# Patient Record
Sex: FEMALE | Race: WHITE | Employment: OTHER | URBAN - METROPOLITAN AREA
[De-identification: names, ages, dates, MRNs, and addresses within clinical notes are randomized per-mention and may not be internally consistent; named-entity substitution may affect disease eponyms.]

---

## 2018-04-04 ENCOUNTER — TELEPHONE (OUTPATIENT)
Dept: ADMINISTRATIVE | Age: 38
End: 2018-04-04

## 2019-07-12 ENCOUNTER — OFFICE VISIT (OUTPATIENT)
Dept: FAMILY MEDICINE CLINIC | Age: 39
End: 2019-07-12
Payer: COMMERCIAL

## 2019-07-12 VITALS
WEIGHT: 141 LBS | OXYGEN SATURATION: 98 % | TEMPERATURE: 99.1 F | SYSTOLIC BLOOD PRESSURE: 102 MMHG | DIASTOLIC BLOOD PRESSURE: 70 MMHG | HEIGHT: 66 IN | HEART RATE: 78 BPM | BODY MASS INDEX: 22.66 KG/M2

## 2019-07-12 DIAGNOSIS — G47.20 DISRUPTED SLEEP-WAKE CYCLE: ICD-10-CM

## 2019-07-12 DIAGNOSIS — F17.210 CIGARETTE NICOTINE DEPENDENCE WITHOUT COMPLICATION: ICD-10-CM

## 2019-07-12 DIAGNOSIS — F51.8 DISRUPTED SLEEP-WAKE CYCLE: ICD-10-CM

## 2019-07-12 DIAGNOSIS — Z00.00 WELL ADULT EXAM: Primary | ICD-10-CM

## 2019-07-12 PROCEDURE — 99203 OFFICE O/P NEW LOW 30 MIN: CPT | Performed by: FAMILY MEDICINE

## 2019-07-12 PROCEDURE — 4004F PT TOBACCO SCREEN RCVD TLK: CPT | Performed by: FAMILY MEDICINE

## 2019-07-12 PROCEDURE — G8420 CALC BMI NORM PARAMETERS: HCPCS | Performed by: FAMILY MEDICINE

## 2019-07-12 PROCEDURE — G8427 DOCREV CUR MEDS BY ELIG CLIN: HCPCS | Performed by: FAMILY MEDICINE

## 2019-07-12 RX ORDER — VARENICLINE TARTRATE 25 MG
KIT ORAL
Qty: 1 BOX | Refills: 0 | Status: SHIPPED
Start: 2019-07-12 | End: 2020-07-13

## 2019-07-12 RX ORDER — CHLORAL HYDRATE 500 MG
3000 CAPSULE ORAL 3 TIMES DAILY
COMMUNITY

## 2019-07-12 ASSESSMENT — PATIENT HEALTH QUESTIONNAIRE - PHQ9
SUM OF ALL RESPONSES TO PHQ9 QUESTIONS 1 & 2: 0
SUM OF ALL RESPONSES TO PHQ QUESTIONS 1-9: 0
2. FEELING DOWN, DEPRESSED OR HOPELESS: 0
SUM OF ALL RESPONSES TO PHQ QUESTIONS 1-9: 0
1. LITTLE INTEREST OR PLEASURE IN DOING THINGS: 0

## 2019-07-12 NOTE — PROGRESS NOTES
Used    Tobacco comment: started patch on 10/18/15   Substance Use Topics    Alcohol use: Yes     Alcohol/week: 0.0 oz    Drug use: Never   She remains in relationship. She is raising her son and daughter. She continues working, self-employed. ROS:  No CP, No palpitations,   No sob, No cough,   No abd pain, No heartburn,   No headaches,   No tingling, No numbness, No weakness,   No bowel changes, No hematochezia, No melena,  No bladder changes, No hematuria  No skin rashes, No skin lesions. No vision changes, No hearing changes,   No polyuria, polydipsia, polyphagia. Stable mood. ROS otherwise negative unless as listed in HPI. Chart reviewed and updated where appropriate for PMH, Fam, and Soc Hx. Physical Exam   /70 (Site: Left Upper Arm, Position: Sitting, Cuff Size: Medium Adult)   Pulse 78   Temp 99.1 °F (37.3 °C) (Oral)   Ht 5' 6\" (1.676 m)   Wt 141 lb (64 kg)   SpO2 98%   BMI 22.76 kg/m²   Wt Readings from Last 3 Encounters:   07/12/19 141 lb (64 kg)       Constitutional:    She is oriented to person, place, and time. She appears well-developed and well-nourished. HENT:    Nose: Nose normal.    Mouth/Throat: Oropharynx is clear and moist.   Eyes:    Conjunctivae are normal.    Pupils are equal, round, and reactive to light. EOMI. Neck:    Normal range of motion. No thyromegaly or nodules noted. No bruit. No adenopathy  Cardiovascular:    Normal rate, regular rhythm and normal heart sounds. No murmur. No gallop and no friction rub. Pulmonary/Chest:    Effort normal and breath sounds normal.    No wheezes. No rales or rhonchi. Abdominal:    Soft. Bowel sounds are normal.    No distension. No tenderness. Musculoskeletal:    Normal range of motion. No joint swelling noted. No peripheral edema. Skin:    Skin is warm and dry. No rashes, lesions. Psychiatric:    She has a normal mood and affect. Normal groom and dress.     Current Outpatient

## 2020-01-30 ENCOUNTER — TELEPHONE (OUTPATIENT)
Dept: FAMILY MEDICINE CLINIC | Age: 40
End: 2020-01-30

## 2020-01-30 NOTE — TELEPHONE ENCOUNTER
Called pt to remind her of her labs that need drawn. Pt plans on coming in Monday. This is 3rd attempt reminding pt.  Labs extended 2 weeks to 2/13

## 2020-02-21 ENCOUNTER — TELEPHONE (OUTPATIENT)
Dept: FAMILY MEDICINE CLINIC | Age: 40
End: 2020-02-21

## 2020-02-24 ENCOUNTER — HOSPITAL ENCOUNTER (OUTPATIENT)
Age: 40
Discharge: HOME | End: 2020-02-26
Payer: COMMERCIAL

## 2020-02-24 PROCEDURE — 88175 CYTOPATH C/V AUTO FLUID REDO: CPT

## 2020-02-24 PROCEDURE — 87624 HPV HI-RISK TYP POOLED RSLT: CPT

## 2020-02-27 LAB
HPV SAMPLE: NORMAL
HPV TYPE 16: NOT DETECTED
HPV TYPE 18: NOT DETECTED
HPV, HIGH RISK OTHER: NOT DETECTED
INTERPRETATION: NORMAL
SOURCE: NORMAL

## 2020-03-03 ENCOUNTER — TELEPHONE (OUTPATIENT)
Dept: FAMILY MEDICINE CLINIC | Age: 40
End: 2020-03-03

## 2020-04-28 ENCOUNTER — VIRTUAL VISIT (OUTPATIENT)
Dept: PRIMARY CARE CLINIC | Age: 40
End: 2020-04-28
Payer: COMMERCIAL

## 2020-04-28 VITALS — BODY MASS INDEX: 21.48 KG/M2 | HEIGHT: 69 IN | WEIGHT: 145 LBS

## 2020-04-28 PROCEDURE — G8427 DOCREV CUR MEDS BY ELIG CLIN: HCPCS | Performed by: FAMILY MEDICINE

## 2020-04-28 PROCEDURE — G8420 CALC BMI NORM PARAMETERS: HCPCS | Performed by: FAMILY MEDICINE

## 2020-04-28 PROCEDURE — 99213 OFFICE O/P EST LOW 20 MIN: CPT | Performed by: FAMILY MEDICINE

## 2020-04-28 PROCEDURE — 4004F PT TOBACCO SCREEN RCVD TLK: CPT | Performed by: FAMILY MEDICINE

## 2020-04-28 RX ORDER — PREDNISONE 10 MG/1
TABLET ORAL
Qty: 30 TABLET | Refills: 0 | Status: SHIPPED | OUTPATIENT
Start: 2020-04-28 | End: 2020-05-08

## 2020-04-28 ASSESSMENT — PATIENT HEALTH QUESTIONNAIRE - PHQ9
SUM OF ALL RESPONSES TO PHQ QUESTIONS 1-9: 0
1. LITTLE INTEREST OR PLEASURE IN DOING THINGS: 0
2. FEELING DOWN, DEPRESSED OR HOPELESS: 0
SUM OF ALL RESPONSES TO PHQ9 QUESTIONS 1 & 2: 0
SUM OF ALL RESPONSES TO PHQ QUESTIONS 1-9: 0

## 2020-07-13 ENCOUNTER — OFFICE VISIT (OUTPATIENT)
Dept: FAMILY MEDICINE CLINIC | Age: 40
End: 2020-07-13
Payer: COMMERCIAL

## 2020-07-13 ENCOUNTER — HOSPITAL ENCOUNTER (OUTPATIENT)
Age: 40
Discharge: HOME | End: 2020-07-15
Payer: COMMERCIAL

## 2020-07-13 VITALS
WEIGHT: 145 LBS | DIASTOLIC BLOOD PRESSURE: 76 MMHG | HEART RATE: 87 BPM | TEMPERATURE: 98.3 F | HEIGHT: 69 IN | OXYGEN SATURATION: 98 % | BODY MASS INDEX: 21.48 KG/M2 | SYSTOLIC BLOOD PRESSURE: 126 MMHG

## 2020-07-13 LAB
ALBUMIN SERPL-MCNC: 4.3 G/DL (ref 3.5–5.2)
ALP BLD-CCNC: 31 U/L (ref 35–104)
ALT SERPL-CCNC: 9 U/L (ref 0–32)
ANION GAP SERPL CALCULATED.3IONS-SCNC: 14 MMOL/L (ref 7–16)
AST SERPL-CCNC: 18 U/L (ref 0–31)
BASOPHILS ABSOLUTE: 0.06 E9/L (ref 0–0.2)
BASOPHILS RELATIVE PERCENT: 0.9 % (ref 0–2)
BILIRUB SERPL-MCNC: 0.2 MG/DL (ref 0–1.2)
BUN BLDV-MCNC: 9 MG/DL (ref 6–20)
CALCIUM SERPL-MCNC: 9.5 MG/DL (ref 8.6–10.2)
CHLORIDE BLD-SCNC: 103 MMOL/L (ref 98–107)
CHOLESTEROL, TOTAL: 184 MG/DL (ref 0–199)
CO2: 23 MMOL/L (ref 22–29)
CREAT SERPL-MCNC: 0.7 MG/DL (ref 0.5–1)
EOSINOPHILS ABSOLUTE: 0.08 E9/L (ref 0.05–0.5)
EOSINOPHILS RELATIVE PERCENT: 1.1 % (ref 0–6)
GFR AFRICAN AMERICAN: >60
GFR NON-AFRICAN AMERICAN: >60 ML/MIN/1.73
GLUCOSE BLD-MCNC: 88 MG/DL (ref 74–99)
HCT VFR BLD CALC: 38.9 % (ref 34–48)
HDLC SERPL-MCNC: 102 MG/DL
HEMOGLOBIN: 12.5 G/DL (ref 11.5–15.5)
IMMATURE GRANULOCYTES #: 0.03 E9/L
IMMATURE GRANULOCYTES %: 0.4 % (ref 0–5)
LDL CHOLESTEROL CALCULATED: 72 MG/DL (ref 0–99)
LYMPHOCYTES ABSOLUTE: 1.59 E9/L (ref 1.5–4)
LYMPHOCYTES RELATIVE PERCENT: 22.7 % (ref 20–42)
MCH RBC QN AUTO: 31.4 PG (ref 26–35)
MCHC RBC AUTO-ENTMCNC: 32.1 % (ref 32–34.5)
MCV RBC AUTO: 97.7 FL (ref 80–99.9)
MONOCYTES ABSOLUTE: 0.48 E9/L (ref 0.1–0.95)
MONOCYTES RELATIVE PERCENT: 6.9 % (ref 2–12)
NEUTROPHILS ABSOLUTE: 4.75 E9/L (ref 1.8–7.3)
NEUTROPHILS RELATIVE PERCENT: 68 % (ref 43–80)
PDW BLD-RTO: 13.2 FL (ref 11.5–15)
PLATELET # BLD: 301 E9/L (ref 130–450)
PMV BLD AUTO: 9.6 FL (ref 7–12)
POTASSIUM SERPL-SCNC: 4.4 MMOL/L (ref 3.5–5)
RBC # BLD: 3.98 E12/L (ref 3.5–5.5)
SODIUM BLD-SCNC: 140 MMOL/L (ref 132–146)
TOTAL PROTEIN: 6.7 G/DL (ref 6.4–8.3)
TRIGL SERPL-MCNC: 50 MG/DL (ref 0–149)
VLDLC SERPL CALC-MCNC: 10 MG/DL
WBC # BLD: 7 E9/L (ref 4.5–11.5)

## 2020-07-13 PROCEDURE — 99396 PREV VISIT EST AGE 40-64: CPT | Performed by: FAMILY MEDICINE

## 2020-07-13 PROCEDURE — 80053 COMPREHEN METABOLIC PANEL: CPT

## 2020-07-13 PROCEDURE — 80061 LIPID PANEL: CPT

## 2020-07-13 PROCEDURE — 36415 COLL VENOUS BLD VENIPUNCTURE: CPT

## 2020-07-13 PROCEDURE — 85025 COMPLETE CBC W/AUTO DIFF WBC: CPT

## 2020-07-13 RX ORDER — ASCORBIC ACID 500 MG
500 TABLET ORAL DAILY
COMMUNITY

## 2020-07-13 RX ORDER — NICOTINE 21 MG/24HR
1 PATCH, TRANSDERMAL 24 HOURS TRANSDERMAL EVERY 24 HOURS
Qty: 30 PATCH | Refills: 0 | Status: SHIPPED
Start: 2020-07-13 | End: 2021-07-13

## 2020-07-13 NOTE — PROGRESS NOTES
[facility]  Office Progress Note - Dr. Salbador Perez  7/13/20    CC:   Chief Complaint   Patient presents with    Annual Exam        HPI:   Patient presents for yearly physical.     Overall they are doing well. Concerns: started smoking again about 2 weeks ago after off cigs for 6 months. Was drinking, was stressed. She did not tolerate chantix. Made her feel bad. Weight reviewed. Body mass index is 21.41 kg/m². Wt Readings from Last 3 Encounters:   07/13/20 145 lb (65.8 kg)   04/28/20 145 lb (65.8 kg)   02/24/20 152 lb (68.9 kg)     Patient is  currently working on diet. Patient is  currently working on exercise. Exercises regularly and works with a . Vaccines reviewed. Discussed age appropriate vaccine recommendations. HM Reviewed. Discussed age appropriate HM topics. Patient was encouraged to update HM topics. Ordered where agreeable. Reviewed age appropriate anticipatory guidance. Recent labs reviewed include: none    The ASCVD Risk score (Federica Gupta., et al., 2013) failed to calculate for the following reasons:    Cannot find a previous HDL lab    Cannot find a previous total cholesterol lab    PMH, FH, SxHx, Social Hx reviewed and updated if needed. Had covid testing recently - about 10 days ago and found to be neg     Gyne notes from feb reviewed. Had small cysts in breast and pap performed.         _________________________________________________________  Past Medical History:   Diagnosis Date    Abnormal Pap smear of cervix        Family History   Problem Relation Age of Onset    Other Mother         hysterectomy at 27 due to heavy bleeding    Diabetes Father         ON his side.  Breast Cancer Maternal Grandmother        History reviewed. No pertinent surgical history.     Social History     Tobacco Use    Smoking status: Current Some Day Smoker     Packs/day: 1.00     Years: 24.00     Pack years: 24.00     Types: Cigarettes    Smokeless tobacco: Never Used    Tobacco comment: started patch on 10/18/15   Substance Use Topics    Alcohol use: Yes     Alcohol/week: 0.0 standard drinks    Drug use: Never     _________________________________________________________  ROS: POSITIVE:  Otherwise:  No CP, No palpitations,   No sob, No cough,   No abd pain, No heartburn,   No headaches, No vision changes, No hearing changes,   No tingling, No numbness, No weakness,   No bowel changes, No hematochezia, No melena,  No bladder changes, No hematuria  No skin rashes, No skin lesions. No polyuria, polydipsia, polyphagia. Stable mood. ROS otherwise negative unless as listed in HPI. Chart reviewed and updated where appropriate for PMH, Fam, and Soc Hx.  __________________________________________________________  Physical Exam   /76 (Site: Left Upper Arm, Position: Sitting, Cuff Size: Medium Adult)   Pulse 87   Temp 98.3 °F (36.8 °C) (Temporal)   Ht 5' 9\" (1.753 m)   Wt 145 lb (65.8 kg)   SpO2 98%   BMI 21.41 kg/m²   Wt Readings from Last 3 Encounters:   07/13/20 145 lb (65.8 kg)   04/28/20 145 lb (65.8 kg)   02/24/20 152 lb (68.9 kg)       Constitutional:    She is oriented to person, place, and time. She appears well-developed and well-nourished. HENT:    Nose: Nose normal.    Mouth/Throat: Oropharynx is clear and moist.   Eyes:    Conjunctivae are normal.    Pupils are equal, round, and reactive to light. EOMI. Neck:    Normal range of motion. No thyromegaly or nodules noted. No bruit. Cardiovascular:    Normal rate, regular rhythm and normal heart sounds. No murmur. No gallop and no friction rub. Pulmonary/Chest:    Effort normal and breath sounds normal.    No wheezes. No rales or rhonchi. Abdominal:    Soft. Bowel sounds are normal.    No distension. No tenderness. Musculoskeletal:    Normal range of motion. No joint swelling noted. No peripheral edema. Skin:    Skin is warm and dry.     No rashes, using dictation software.  Efforts were made to reduce grammatical or syntax errors, but some may persist.

## 2021-07-13 ENCOUNTER — OFFICE VISIT (OUTPATIENT)
Dept: FAMILY MEDICINE CLINIC | Age: 41
End: 2021-07-13
Payer: COMMERCIAL

## 2021-07-13 VITALS
WEIGHT: 145.8 LBS | TEMPERATURE: 97.6 F | BODY MASS INDEX: 21.59 KG/M2 | SYSTOLIC BLOOD PRESSURE: 118 MMHG | RESPIRATION RATE: 16 BRPM | DIASTOLIC BLOOD PRESSURE: 62 MMHG | OXYGEN SATURATION: 98 % | HEART RATE: 78 BPM | HEIGHT: 69 IN

## 2021-07-13 DIAGNOSIS — F41.9 ANXIETY: ICD-10-CM

## 2021-07-13 DIAGNOSIS — Z00.00 ENCOUNTER FOR WELL ADULT EXAM WITHOUT ABNORMAL FINDINGS: Primary | ICD-10-CM

## 2021-07-13 PROCEDURE — 99396 PREV VISIT EST AGE 40-64: CPT | Performed by: FAMILY MEDICINE

## 2021-07-13 RX ORDER — FLUOXETINE 10 MG/1
10 CAPSULE ORAL DAILY
Qty: 30 CAPSULE | Refills: 3 | Status: SHIPPED
Start: 2021-07-13 | End: 2021-08-23

## 2021-07-13 SDOH — ECONOMIC STABILITY: FOOD INSECURITY: WITHIN THE PAST 12 MONTHS, YOU WORRIED THAT YOUR FOOD WOULD RUN OUT BEFORE YOU GOT MONEY TO BUY MORE.: NEVER TRUE

## 2021-07-13 SDOH — ECONOMIC STABILITY: FOOD INSECURITY: WITHIN THE PAST 12 MONTHS, THE FOOD YOU BOUGHT JUST DIDN'T LAST AND YOU DIDN'T HAVE MONEY TO GET MORE.: NEVER TRUE

## 2021-07-13 ASSESSMENT — PATIENT HEALTH QUESTIONNAIRE - PHQ9
SUM OF ALL RESPONSES TO PHQ QUESTIONS 1-9: 0
2. FEELING DOWN, DEPRESSED OR HOPELESS: 0
SUM OF ALL RESPONSES TO PHQ QUESTIONS 1-9: 0
1. LITTLE INTEREST OR PLEASURE IN DOING THINGS: 0
SUM OF ALL RESPONSES TO PHQ QUESTIONS 1-9: 0
SUM OF ALL RESPONSES TO PHQ9 QUESTIONS 1 & 2: 0

## 2021-07-13 ASSESSMENT — SOCIAL DETERMINANTS OF HEALTH (SDOH): HOW HARD IS IT FOR YOU TO PAY FOR THE VERY BASICS LIKE FOOD, HOUSING, MEDICAL CARE, AND HEATING?: NOT VERY HARD

## 2021-07-13 NOTE — PROGRESS NOTES
[facility]  Office Progress Note - Dr. Ernesto Lopez  7/13/21    CC:   Chief Complaint   Patient presents with    Annual Exam        /62   Pulse 78   Temp 97.6 °F (36.4 °C) (Temporal)   Resp 16   Ht 5' 9\" (1.753 m)   Wt 145 lb 12.8 oz (66.1 kg)   SpO2 98%   BMI 21.53 kg/m²   Wt Readings from Last 3 Encounters:   07/13/21 145 lb 12.8 oz (66.1 kg)   07/13/20 145 lb (65.8 kg)   04/28/20 145 lb (65.8 kg)       HPI:   Patient presents for yearly physical.     Overall they are doing well. Concerns: anxiety  Has tried to cut coffee back. Problem on and off. When occurs heart racing, 20 steps ahead of self, hard time concentrating, feels scattered. She did try effexor in the past, felt like a zombie, didn't take it long. In the winter, she will often get seasonal depression. Not feeling that recently. Drinks beer or wine pretty regularly, but she does not feel to excess. Does take some breaks from that/fasts from it during periods of time. Weight reviewed. Body mass index is 21.53 kg/m². Wt Readings from Last 3 Encounters:   07/13/21 145 lb 12.8 oz (66.1 kg)   07/13/20 145 lb (65.8 kg)   04/28/20 145 lb (65.8 kg)     Patient is currently working on diet. Patient is currently working on exercise. Vaccines reviewed. Discussed age appropriate vaccine recommendations. HM Reviewed. Discussed age appropriate HM topics. Patient was encouraged to update HM topics. Ordered where agreeable. Reviewed age appropriate anticipatory guidance. Recent labs reviewed include: none, order today. Normal BMP, normal LFTs, normal glucose, normal TSH at 1.4, normal CBC. The ASCVD Risk score (Ethan Correa., et al., 2013) failed to calculate for the following reasons: The valid HDL cholesterol range is 20 to 100 mg/dL    PMH, FH, SxHx, Social Hx reviewed and updated if needed.         _________________________________________________________    Assessment / Plan  Betty was seen today for annual exam.    Diagnoses and all orders for this visit:    Encounter for well adult exam without abnormal findings  -     CBC Auto Differential; Future  -     Comprehensive Metabolic flu panel; Future  -     TSH without Reflex; Future  Overall Betty Jaimes is doing well. Age appropriate HM topics reviewed and updated where agreeable. Vaccines reviewed and updated where agreeable. Age appropriate anticipatory guidance discussed. Encouraged to work on [employer] and exercise strategies. Opportunity to ask questions and answers provided as able. Counseled on smoking cessation. Start some Prozac at low-dose for persistent anxiety. Discussed acceptable alcohol consumption guidelines and that chronic alcohol consumption can worsen/complicate anxiety    Recommend RTO in 1 year for annual physical.   1 month for follow-up on starting Prozac. Anxiety  -     FLUoxetine (PROZAC) 10 MG capsule; Take 1 capsule by mouth daily  Discussed common side effects of this medication and problems that would necessitate calling the office for advice or stopping the medication. All questions answered. Return in about 1 month (around 8/13/2021). or as scheduled. Patient counseled to follow up sooner or seek more acute care if symptoms worsening or not improving according to plan. Electronically signed by Oksana Hernandez MD on 7/14/2021    _________________________________________________________  Current Outpatient Medications on File Prior to Visit   Medication Sig Dispense Refill    vitamin C (ASCORBIC ACID) 500 MG tablet Take 500 mg by mouth daily      cyanocobalamin 1000 MCG tablet Take 5,000 mcg by mouth daily      Omega-3 Fatty Acids (FISH OIL) 1000 MG CAPS Take 3,000 mg by mouth 3 times daily      Probiotic Product (PROBIOTIC-10 PO) Take by mouth Every 3 days       No current facility-administered medications on file prior to visit.        Patient Active Problem List Diagnosis Code    Cigarette nicotine dependence without complication G18.907     _________________________________________________________  Past Medical History:   Diagnosis Date    Abnormal Pap smear of cervix        Family History   Problem Relation Age of Onset    Other Mother         hysterectomy at 27 due to heavy bleeding    Diabetes Father         ON his side.  Breast Cancer Maternal Grandmother        No past surgical history on file. Social History     Tobacco Use    Smoking status: Current Some Day Smoker     Packs/day: 1.00     Years: 24.00     Pack years: 24.00     Types: Cigarettes    Smokeless tobacco: Never Used    Tobacco comment: started patch on 10/18/15   Substance Use Topics    Alcohol use: Yes     Alcohol/week: 0.0 standard drinks    Drug use: Never       Chart reviewed and updated where appropriate for PMH, Fam, and Soc Hx.  _________________________________________________________  ROS: POSITIVE: As in the HPI. Otherwise Pertinent negatives are negative.    __________________________________________________________  Physical Exam   Constitutional:    She is oriented to person, place, and time. She appears well-developed and well-nourished. HENT:    Right Ear: normal Pinna, normal canal, normal TM. Left Ear: normal Pinna, normal canal, normal TM. Nose: Nose normal.    Mouth/Throat: Oropharynx is clear and moist.   Eyes:    Conjunctivae are normal.    Pupils are equal, round, and reactive to light. EOMI. Neck:    Normal range of motion. No thyromegaly or nodules noted. No bruit. No LAD. Cardiovascular:    Normal rate, regular rhythm and normal heart sounds. No murmur. No gallop and no friction rub. Pulmonary/Chest:    Effort normal and breath sounds normal.    No wheezes. No rales or rhonchi. Abdominal:    Soft. Bowel sounds are normal.    No distension. No tenderness. Musculoskeletal:    Normal range of motion. No joint swelling noted. No peripheral edema. Neurological:    She is A&Ox3    Motor and sensation grossly intact. Normal Gait. Skin:    Skin is warm and dry. No rashes, No lesions. Psychiatric:    She has an often anxious mood and normal affect. Normal groom and dress. No SI or HI.   ________________________________________________________    This note may have been created using dictation software.  Efforts were made to reduce errors, but some may persist.

## 2021-08-23 ENCOUNTER — OFFICE VISIT (OUTPATIENT)
Dept: FAMILY MEDICINE CLINIC | Age: 41
End: 2021-08-23
Payer: COMMERCIAL

## 2021-08-23 VITALS
TEMPERATURE: 98.6 F | OXYGEN SATURATION: 99 % | DIASTOLIC BLOOD PRESSURE: 84 MMHG | SYSTOLIC BLOOD PRESSURE: 116 MMHG | WEIGHT: 148 LBS | BODY MASS INDEX: 21.92 KG/M2 | HEART RATE: 60 BPM | HEIGHT: 69 IN

## 2021-08-23 DIAGNOSIS — F41.9 ANXIETY: Primary | ICD-10-CM

## 2021-08-23 PROCEDURE — G8427 DOCREV CUR MEDS BY ELIG CLIN: HCPCS | Performed by: FAMILY MEDICINE

## 2021-08-23 PROCEDURE — 4004F PT TOBACCO SCREEN RCVD TLK: CPT | Performed by: FAMILY MEDICINE

## 2021-08-23 PROCEDURE — 99213 OFFICE O/P EST LOW 20 MIN: CPT | Performed by: FAMILY MEDICINE

## 2021-08-23 PROCEDURE — G8420 CALC BMI NORM PARAMETERS: HCPCS | Performed by: FAMILY MEDICINE

## 2021-08-23 RX ORDER — FLUOXETINE HYDROCHLORIDE 20 MG/1
20 CAPSULE ORAL DAILY
Qty: 30 CAPSULE | Refills: 2 | Status: SHIPPED
Start: 2021-08-23 | End: 2022-01-04

## 2021-08-23 NOTE — PROGRESS NOTES
[facility]  Office Progress Note - Dr. Rebel Ibarra  8/23/21    CC:   Chief Complaint   Patient presents with    Anxiety     Started Prozac last month - Pt states she has seen some improvment in her anxiety        /84 (Site: Left Upper Arm, Position: Sitting, Cuff Size: Large Adult)   Pulse 60   Temp 98.6 °F (37 °C) (Temporal)   Ht 5' 9\" (1.753 m)   Wt 148 lb (67.1 kg)   SpO2 99%   BMI 21.86 kg/m²   Wt Readings from Last 3 Encounters:   08/23/21 148 lb (67.1 kg)   07/13/21 145 lb 12.8 oz (66.1 kg)   07/13/20 145 lb (65.8 kg)       HPI: Pt states she is feeling better. Takes prozac at end of day  Would like to up the dose bc some days she feels like different than others e.g. during her premenstrual days however it does not happen every month  Overall she feels her anxiety is going down, recently went to concert and enjoyed it, was not anxious around public  otherwise eating /drinking well enough  Sleep is ok but she still takes melatonin almost every day    She ate beets last week, and noticed red colored stool for 3 days. Resolved. _______________________________________________________    Assessment / Plan  Betty was seen today for anxiety. Diagnoses and all orders for this visit:    Anxiety  -     FLUoxetine (PROZAC) 20 MG capsule; Take 1 capsule by mouth daily  Improved  Increase to standard 20mg prozac. Call with problems. Otherwise check in in a few months, sooner if needed. Return in about 10 weeks (around 11/1/2021). or as scheduled. Patient counseled to follow up sooner or seek more acute care if symptoms worsening or not improving according to plan.      Electronically signed by Emerald Sawyer MD on 8/25/2021    _________________________________________________________  Current Outpatient Medications on File Prior to Visit   Medication Sig Dispense Refill    vitamin C (ASCORBIC ACID) 500 MG tablet Take 500 mg by mouth daily      Omega-3 Fatty Acids (FISH OIL) 1000 MG CAPS Take 3,000 mg by mouth 3 times daily      Probiotic Product (PROBIOTIC-10 PO) Take by mouth Every 3 days       No current facility-administered medications on file prior to visit. Patient Active Problem List   Diagnosis Code    Cigarette nicotine dependence without complication N98.820     _________________________________________________________  Past Medical History:   Diagnosis Date    Abnormal Pap smear of cervix        Family History   Problem Relation Age of Onset    Other Mother         hysterectomy at 27 due to heavy bleeding    Diabetes Father         ON his side.  Breast Cancer Maternal Grandmother        No past surgical history on file. Social History     Tobacco Use    Smoking status: Current Every Day Smoker     Packs/day: 0.75     Years: 24.00     Pack years: 18.00     Types: Cigarettes    Smokeless tobacco: Never Used    Tobacco comment: started patch on 10/18/15   Substance Use Topics    Alcohol use: Yes     Alcohol/week: 0.0 standard drinks    Drug use: Never       Chart reviewed and updated where appropriate for PMH, Fam, and Soc Hx.  _________________________________________________________  ROS: POSITIVE: As in the HPI. Otherwise Pertinent negatives are negative.    __________________________________________________________  Physical Exam   Constitutional:    She is oriented to person, place, and time. She appears well-developed and well-nourished. Cardiovascular:    Normal rate, regular rhythm and normal heart sounds. No murmur. No gallop and no friction rub. Pulmonary/Chest:    Effort normal and breath sounds normal.    No wheezes. No rales or rhonchi. Skin:    Skin is warm and dry. No rashes, No lesions. Psychiatric:    She has a normal mood and affect. Normal groom and dress. No SI or HI.   ________________________________________________________    This note may have been created using dictation software.  Efforts were made to reduce errors, but some may persist.

## 2021-09-15 ENCOUNTER — TELEPHONE (OUTPATIENT)
Dept: FAMILY MEDICINE CLINIC | Age: 41
End: 2021-09-15

## 2021-09-15 NOTE — TELEPHONE ENCOUNTER
Betty calling the office today and stated that she had two glasses of wine last night and when she woke up she felt dizzy and light headed. Pt stated that she has drank that many drink before and has never had that problem. Pt was wondering if it could be a reaction due to the new medication Prozac? Please advise.

## 2021-09-16 NOTE — TELEPHONE ENCOUNTER
It is possible that she had some interaction there which caused some excessive side effects. Would  her to be cautious with that.

## 2021-10-22 ENCOUNTER — OFFICE VISIT (OUTPATIENT)
Dept: FAMILY MEDICINE CLINIC | Age: 41
End: 2021-10-22
Payer: COMMERCIAL

## 2021-10-22 VITALS
WEIGHT: 147 LBS | SYSTOLIC BLOOD PRESSURE: 110 MMHG | DIASTOLIC BLOOD PRESSURE: 60 MMHG | OXYGEN SATURATION: 98 % | TEMPERATURE: 98 F | HEART RATE: 64 BPM | BODY MASS INDEX: 21.71 KG/M2

## 2021-10-22 DIAGNOSIS — R05.9 COUGH: ICD-10-CM

## 2021-10-22 DIAGNOSIS — R09.81 HEAD CONGESTION: ICD-10-CM

## 2021-10-22 DIAGNOSIS — J34.89 SINUS DRAINAGE: Primary | ICD-10-CM

## 2021-10-22 LAB
Lab: ABNORMAL
PERFORMING INSTRUMENT: ABNORMAL
QC PASS/FAIL: ABNORMAL
SARS-COV-2, POC: DETECTED

## 2021-10-22 PROCEDURE — 99213 OFFICE O/P EST LOW 20 MIN: CPT | Performed by: INTERNAL MEDICINE

## 2021-10-22 PROCEDURE — G8427 DOCREV CUR MEDS BY ELIG CLIN: HCPCS | Performed by: INTERNAL MEDICINE

## 2021-10-22 PROCEDURE — 4004F PT TOBACCO SCREEN RCVD TLK: CPT | Performed by: INTERNAL MEDICINE

## 2021-10-22 PROCEDURE — 87426 SARSCOV CORONAVIRUS AG IA: CPT | Performed by: INTERNAL MEDICINE

## 2021-10-22 PROCEDURE — G8484 FLU IMMUNIZE NO ADMIN: HCPCS | Performed by: INTERNAL MEDICINE

## 2021-10-22 PROCEDURE — G8420 CALC BMI NORM PARAMETERS: HCPCS | Performed by: INTERNAL MEDICINE

## 2021-10-22 RX ORDER — FLUTICASONE PROPIONATE 50 MCG
2 SPRAY, SUSPENSION (ML) NASAL DAILY
Qty: 16 G | Refills: 0 | Status: SHIPPED
Start: 2021-10-22 | End: 2022-01-06

## 2021-10-22 RX ORDER — DOXYCYCLINE HYCLATE 100 MG
100 TABLET ORAL 2 TIMES DAILY
Qty: 20 TABLET | Refills: 0 | Status: SHIPPED | OUTPATIENT
Start: 2021-10-22 | End: 2021-11-01

## 2021-10-22 RX ORDER — PREDNISONE 10 MG/1
TABLET ORAL
Qty: 12 TABLET | Refills: 0 | Status: SHIPPED | OUTPATIENT
Start: 2021-10-22 | End: 2021-10-28

## 2021-10-24 ASSESSMENT — ENCOUNTER SYMPTOMS
NAUSEA: 0
VOMITING: 0
EYES NEGATIVE: 1
SORE THROAT: 1
ABDOMINAL PAIN: 0
CHEST TIGHTNESS: 0
DIARRHEA: 0
SHORTNESS OF BREATH: 0
WHEEZING: 0
COUGH: 1
SINUS PRESSURE: 0
SINUS PAIN: 0

## 2021-10-24 NOTE — PROGRESS NOTES
[address] [place]     10/24/21  Betty Ponce : 1980 Sex: female  Age: 35 y.o. Chief Complaint   Patient presents with    Congestion    Cough    Headache    Pharyngitis       HPI  Patient presents today to express care complaining of 8 to 9 days worth of sore throat, head congestion, headache cough and chills. Has had some yellow sinus drainage and sputum production. Denies  myalgia or loss of taste and smell. Patient is unvaccinated for Covid. States she is not pregnant. Review of Systems   Constitutional: Positive for chills. Negative for fever. HENT: Positive for congestion, postnasal drip and sore throat. Negative for ear pain, sinus pressure and sinus pain. Eyes: Negative. Respiratory: Positive for cough. Negative for chest tightness, shortness of breath and wheezing. Cardiovascular: Negative for chest pain. Gastrointestinal: Negative for abdominal pain, diarrhea, nausea and vomiting. Musculoskeletal: Negative for myalgias. Neurological: Positive for headaches. REST OF PERTINENT ROS GONE OVER AND WAS NEGATIVE. Current Outpatient Medications:     doxycycline hyclate (VIBRA-TABS) 100 MG tablet, Take 1 tablet by mouth 2 times daily for 10 days, Disp: 20 tablet, Rfl: 0    fluticasone (FLONASE) 50 MCG/ACT nasal spray, 2 sprays by Each Nostril route daily, Disp: 16 g, Rfl: 0    predniSONE (DELTASONE) 10 MG tablet, Take 3 tablets by mouth daily for 2 days, THEN 2 tablets daily for 2 days, THEN 1 tablet daily for 2 days. , Disp: 12 tablet, Rfl: 0    FLUoxetine (PROZAC) 20 MG capsule, Take 1 capsule by mouth daily, Disp: 30 capsule, Rfl: 2    vitamin C (ASCORBIC ACID) 500 MG tablet, Take 500 mg by mouth daily (Patient not taking: Reported on 10/22/2021), Disp: , Rfl:     Omega-3 Fatty Acids (FISH OIL) 1000 MG CAPS, Take 3,000 mg by mouth 3 times daily (Patient not taking: Reported on 10/22/2021), Disp: , Rfl:     Probiotic Product (PROBIOTIC-10 PO), Take by mouth Every 3 days (Patient not taking: Reported on 10/22/2021), Disp: , Rfl:   No Known Allergies    Past Medical History:   Diagnosis Date    Abnormal Pap smear of cervix      No past surgical history on file. Family History   Problem Relation Age of Onset    Other Mother         hysterectomy at 27 due to heavy bleeding    Diabetes Father         ON his side.  Breast Cancer Maternal Grandmother      Social History     Socioeconomic History    Marital status:      Spouse name: Not on file    Number of children: Not on file    Years of education: Not on file    Highest education level: Not on file   Occupational History    Not on file   Tobacco Use    Smoking status: Current Every Day Smoker     Packs/day: 0.75     Years: 24.00     Pack years: 18.00     Types: Cigarettes    Smokeless tobacco: Never Used    Tobacco comment: started patch on 10/18/15   Substance and Sexual Activity    Alcohol use: Yes     Alcohol/week: 0.0 standard drinks    Drug use: Never    Sexual activity: Yes     Partners: Male   Other Topics Concern    Not on file   Social History Narrative    Not on file     Social Determinants of Health     Financial Resource Strain: Low Risk     Difficulty of Paying Living Expenses: Not very hard   Food Insecurity: No Food Insecurity    Worried About Running Out of Food in the Last Year: Never true    Jonathan of Food in the Last Year: Never true   Transportation Needs:     Lack of Transportation (Medical):      Lack of Transportation (Non-Medical):    Physical Activity:     Days of Exercise per Week:     Minutes of Exercise per Session:    Stress:     Feeling of Stress :    Social Connections:     Frequency of Communication with Friends and Family:     Frequency of Social Gatherings with Friends and Family:     Attends Shinto Services:     Active Member of Clubs or Organizations:     Attends Club or Organization Meetings:     Marital Status:    Intimate Partner Violence:     Fear of Current or Ex-Partner:     Emotionally Abused:     Physically Abused:     Sexually Abused:        Vitals:    10/22/21 0839   BP: 110/60   Pulse: 64   Temp: 98 °F (36.7 °C)   SpO2: 98%   Weight: 147 lb (66.7 kg)       Physical Exam  Vitals and nursing note reviewed. Constitutional:       General: She is not in acute distress. Appearance: She is well-developed. She is not toxic-appearing. HENT:      Head: Normocephalic and atraumatic. Right Ear: Tympanic membrane, ear canal and external ear normal.      Left Ear: Ear canal and external ear normal.      Nose: Nose normal.      Mouth/Throat:      Mouth: Mucous membranes are moist.      Pharynx: Oropharynx is clear. Posterior oropharyngeal erythema present. No oropharyngeal exudate. Comments: Yellow-colored mucus draining posterior pharynx  Cardiovascular:      Rate and Rhythm: Normal rate and regular rhythm. Heart sounds: Normal heart sounds. No murmur heard. Pulmonary:      Effort: Pulmonary effort is normal. No respiratory distress. Breath sounds: Normal breath sounds. No wheezing, rhonchi or rales. Musculoskeletal:      Cervical back: Normal range of motion and neck supple. No tenderness. Lymphadenopathy:      Cervical: No cervical adenopathy. Skin:     General: Skin is warm and dry. Neurological:      General: No focal deficit present. Mental Status: She is alert and oriented to person, place, and time. Psychiatric:         Mood and Affect: Mood normal.         Behavior: Behavior normal.         Thought Content: Thought content normal.         Judgment: Judgment normal.                 Assessment and Plan:  Betty was seen today for congestion, cough, headache and pharyngitis.     Diagnoses and all orders for this visit:    Sinus drainage  -     POCT COVID-19, Antigen    Cough  -     POCT COVID-19, Antigen    Head congestion  -     POCT COVID-19, Antigen    Other orders  -     doxycycline hyclate (VIBRA-TABS) 100 MG tablet; Take 1 tablet by mouth 2 times daily for 10 days  -     fluticasone (FLONASE) 50 MCG/ACT nasal spray; 2 sprays by Each Nostril route daily  -     predniSONE (DELTASONE) 10 MG tablet; Take 3 tablets by mouth daily for 2 days, THEN 2 tablets daily for 2 days, THEN 1 tablet daily for 2 days. Plan: Patient did test positive for Covid via antigen testing. Quarantine instructions given with the colored sputum/mucus I elected to place her on antibiotics and taper dose prednisone as well as fluticasone nasal spray. Push fluids. Vitamin C, vitamin D, zinc.  Follow-up with PCP. Patient states she is not pregnant. Notify us if not improving. No follow-ups on file. Seen By:  Tony Skaggs MD      *Document was created using voice recognition software. Note was reviewed however may contain grammatical errors.

## 2022-01-04 DIAGNOSIS — F41.9 ANXIETY: ICD-10-CM

## 2022-01-04 RX ORDER — FLUOXETINE HYDROCHLORIDE 20 MG/1
CAPSULE ORAL
Qty: 30 CAPSULE | Refills: 5 | Status: SHIPPED
Start: 2022-01-04 | End: 2022-01-06 | Stop reason: SDUPTHER

## 2022-01-04 NOTE — TELEPHONE ENCOUNTER
Last Appointment:  8/23/2021  Future Appointments   Date Time Provider Irineo Garner   1/6/2022  1:20 PM Zurdo Ribeiro  [address] [place] [address]

## 2022-01-06 ENCOUNTER — OFFICE VISIT (OUTPATIENT)
Dept: FAMILY MEDICINE CLINIC | Age: 42
End: 2022-01-06
Payer: COMMERCIAL

## 2022-01-06 VITALS
WEIGHT: 157 LBS | BODY MASS INDEX: 23.25 KG/M2 | DIASTOLIC BLOOD PRESSURE: 84 MMHG | HEART RATE: 80 BPM | TEMPERATURE: 97.8 F | SYSTOLIC BLOOD PRESSURE: 138 MMHG | HEIGHT: 69 IN | OXYGEN SATURATION: 98 %

## 2022-01-06 DIAGNOSIS — F41.9 ANXIETY: ICD-10-CM

## 2022-01-06 PROCEDURE — G8420 CALC BMI NORM PARAMETERS: HCPCS | Performed by: FAMILY MEDICINE

## 2022-01-06 PROCEDURE — 99214 OFFICE O/P EST MOD 30 MIN: CPT | Performed by: FAMILY MEDICINE

## 2022-01-06 PROCEDURE — G8427 DOCREV CUR MEDS BY ELIG CLIN: HCPCS | Performed by: FAMILY MEDICINE

## 2022-01-06 PROCEDURE — 4004F PT TOBACCO SCREEN RCVD TLK: CPT | Performed by: FAMILY MEDICINE

## 2022-01-06 PROCEDURE — G8484 FLU IMMUNIZE NO ADMIN: HCPCS | Performed by: FAMILY MEDICINE

## 2022-01-06 RX ORDER — FLUOXETINE HYDROCHLORIDE 40 MG/1
CAPSULE ORAL
Qty: 30 CAPSULE | Refills: 1 | Status: SHIPPED
Start: 2022-01-06 | End: 2022-03-21

## 2022-01-06 NOTE — PROGRESS NOTES
[facility]  Office Progress Note - Dr. Mirza Macias  22    CC:   Chief Complaint   Patient presents with    Insomnia     Grief - Lost her daughter 2 weeks ago, inquires about increasing depression meds. /84 (Site: Left Upper Arm, Position: Sitting, Cuff Size: Large Adult)   Pulse 80   Temp 97.8 °F (36.6 °C) (Temporal)   Ht 5' 9\" (1.753 m)   Wt 157 lb (71.2 kg)   SpO2 98%   BMI 23.18 kg/m²   Wt Readings from Last 3 Encounters:   22 157 lb (71.2 kg)   10/22/21 147 lb (66.7 kg)   21 148 lb (67.1 kg)       HPI: 6 month check up. Hasn't been doing as well. Daughter  within the past month or so. Coping ok. Would like to increase prozac dose. Has been out of normal routines not working out as much, hasnt been working, has gained some weight. Ready to start working on all those things again ready to get back to work and into the more normal routine. _________________________________________________________    Assessment / Plan  Betty was seen today for insomnia. Diagnoses and all orders for this visit:    Anxiety, worsened  -  Inc   FLUoxetine (PROZAC) 40 MG capsule; take 1 capsule by mouth once daily    Inc prozac strength. Discussed good sleep hygiene. Doesn't need labs yet. Return in about 6 months (around 2022). or as scheduled. Patient counseled to follow up sooner or seek more acute care if symptoms worsening or not improving according to plan.      Electronically signed by Hola Galo MD on 2022    _________________________________________________________  Current Outpatient Medications on File Prior to Visit   Medication Sig Dispense Refill    vitamin C (ASCORBIC ACID) 500 MG tablet Take 500 mg by mouth daily       Omega-3 Fatty Acids (FISH OIL) 1000 MG CAPS Take 3,000 mg by mouth 3 times daily       Probiotic Product (PROBIOTIC-10 PO) Take by mouth Every 3 days       No current facility-administered medications on file prior to visit. Patient Active Problem List   Diagnosis Code    Cigarette nicotine dependence without complication H93.476     _________________________________________________________  Past Medical History:   Diagnosis Date    Abnormal Pap smear of cervix        Family History   Problem Relation Age of Onset    Other Mother         hysterectomy at 27 due to heavy bleeding    Diabetes Father         ON his side.  Breast Cancer Maternal Grandmother        No past surgical history on file. Social History     Tobacco Use    Smoking status: Current Every Day Smoker     Packs/day: 0.75     Years: 24.00     Pack years: 18.00     Types: Cigarettes    Smokeless tobacco: Never Used    Tobacco comment: started patch on 10/18/15   Substance Use Topics    Alcohol use: Yes     Alcohol/week: 0.0 standard drinks    Drug use: Never       Chart reviewed and updated where appropriate for PMH, Fam, and Soc Hx.  _________________________________________________________  ROS: POSITIVE: As in the HPI. Otherwise Pertinent negatives are negative.    __________________________________________________________  Physical Exam   Constitutional:    She is oriented to person, place, and time. She appears well-developed and well-nourished. Eyes:    Conjunctivae are normal.    Pupils are equal, round, and reactive to light. EOMI. Cardiovascular:    Normal rate, regular rhythm and normal heart sounds. No murmur. No gallop and no friction rub. Pulmonary/Chest:    Effort normal and breath sounds normal.    No wheezes. No rales or rhonchi. Abdominal:    Soft. Bowel sounds are normal.    No distension. No tenderness. Skin:    Skin is warm and dry. No rashes, No lesions. Psychiatric:    She has an anxious mood and normal to tearful affect. Normal groom and dress. No SI or HI.   ________________________________________________________    This note may have been created using dictation software. Efforts were made to reduce errors, but some may persist.

## 2022-03-19 DIAGNOSIS — F41.9 ANXIETY: ICD-10-CM

## 2022-03-21 RX ORDER — FLUOXETINE HYDROCHLORIDE 40 MG/1
CAPSULE ORAL
Qty: 30 CAPSULE | Refills: 2 | Status: SHIPPED
Start: 2022-03-21 | End: 2022-10-04

## 2022-03-21 NOTE — TELEPHONE ENCOUNTER
Last Appointment:  1/6/2022  Future Appointments   Date Time Provider Irineo Garner   6/24/2022  1:00 PM Tessa Vazquez  [address] [place] [address]

## 2022-07-12 ENCOUNTER — TELEPHONE (OUTPATIENT)
Dept: FAMILY MEDICINE CLINIC | Age: 42
End: 2022-07-12

## 2022-07-12 NOTE — TELEPHONE ENCOUNTER
Unable to send in Rx, bc I don't know what I'm treating without evaluating her. Can be seen by me, through urgent care, or try OTC cortisone cream if she would like to manage herself to start.

## 2022-07-12 NOTE — TELEPHONE ENCOUNTER
----- Message from Grace Anderson sent at 7/12/2022  8:34 AM EDT -----  Subject: Message to Provider    QUESTIONS  Information for Provider? Pt is requesting a Rx to [facility] in [place]   for bites on her legs, ankles and feet from the beach. Please contact pt.   ---------------------------------------------------------------------------  --------------  Cheryl Zeng [facility]  [phone]; OK to leave message on voicemail  ---------------------------------------------------------------------------  --------------  SCRIPT ANSWERS  Relationship to Patient?  Self

## 2022-09-07 ENCOUNTER — TELEPHONE (OUTPATIENT)
Dept: FAMILY MEDICINE CLINIC | Age: 42
End: 2022-09-07

## 2022-09-08 ENCOUNTER — OFFICE VISIT (OUTPATIENT)
Dept: FAMILY MEDICINE CLINIC | Age: 42
End: 2022-09-08
Payer: COMMERCIAL

## 2022-09-08 VITALS
DIASTOLIC BLOOD PRESSURE: 88 MMHG | HEIGHT: 69 IN | BODY MASS INDEX: 23.18 KG/M2 | TEMPERATURE: 98.4 F | HEART RATE: 73 BPM | OXYGEN SATURATION: 100 % | SYSTOLIC BLOOD PRESSURE: 136 MMHG

## 2022-09-08 DIAGNOSIS — S46.312A STRAIN OF LEFT TRICEPS, INITIAL ENCOUNTER: Primary | ICD-10-CM

## 2022-09-08 PROCEDURE — 99213 OFFICE O/P EST LOW 20 MIN: CPT | Performed by: FAMILY MEDICINE

## 2022-09-08 ASSESSMENT — PATIENT HEALTH QUESTIONNAIRE - PHQ9
1. LITTLE INTEREST OR PLEASURE IN DOING THINGS: 0
SUM OF ALL RESPONSES TO PHQ9 QUESTIONS 1 & 2: 0
2. FEELING DOWN, DEPRESSED OR HOPELESS: 0
SUM OF ALL RESPONSES TO PHQ QUESTIONS 1-9: 0

## 2022-09-08 NOTE — PROGRESS NOTES
[facility]  Office Progress Note - Dr. Nuria Garcia  9/8/22    CC:   Chief Complaint   Patient presents with    Arm Pain     Constant left arm pain and finger numbness - pain from top of arm down to hand; middle and index finger go numb. Onset 3 weeks. No known injury. 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 Is left handed. Onset 3 weeks ago of LUE. Shooting pain from top of L arm down but not to hand. Constant, even at bedtime. Hard to sleep. No restriction in ROM but pain with continued movement. Continues to clean but now only able to clean house instead of 2. Numbness in 2nd and 3rd digits. - doesn't fit a perfect dermatome. Takes Aleve about every other day, once. Doesn't notice any alleviate  Pain with repetitive motions. ACE wrap and ice on hand doesn't help. Elevation has not alleviated. Last month fell at concert but landed on right leg, doesn't remember hurting arm or feeling any pain in arm thereafter. On exam she has full strength and reproduction of the pain with extension of the elbow in the triceps muscle. Rot cuff testing normal without pain. No neck pain.   _______________________________________________________    Assessment / Plan  Betty was seen today for arm pain. Diagnoses and all orders for this visit:    Strain of left triceps, initial encounter  Conservative approach over next 2 weeks  Aleve twice daily, relative rest.   EMG considered if persisting on follow up. Keep appt in 2-3 weeks. or as scheduled. Patient counseled to follow up sooner or seek more acute care if symptoms worsening or not improving according to plan.      Electronically signed by Manolo Loredo MD on 9/16/2022    _________________________________________________________  Current Outpatient Medications on File Prior to Visit   Medication Sig Dispense Refill    FLUoxetine (PROZAC) 40 MG capsule take 1 capsule by mouth once daily 30 capsule 2    vitamin C (ASCORBIC ACID) 500 MG tablet Take 500 mg by mouth daily       Omega-3 Fatty Acids (FISH OIL) 1000 MG CAPS Take 3,000 mg by mouth 3 times daily       Probiotic Product (PROBIOTIC-10 PO) Take by mouth Every 3 days       No current facility-administered medications on file prior to visit. Patient Active Problem List   Diagnosis Code    Cigarette nicotine dependence without complication C87.820     _________________________________________________________  Past Medical History:   Diagnosis Date    Abnormal Pap smear of cervix        Family History   Problem Relation Age of Onset    Other Mother         hysterectomy at 27 due to heavy bleeding    Diabetes Father         ON his side. Breast Cancer Maternal Grandmother        No past surgical history on file. Social History     Tobacco Use    Smoking status: Every Day     Packs/day: 0.75     Years: 24.00     Pack years: 18.00     Types: Cigarettes    Smokeless tobacco: Never    Tobacco comments:     started patch on 10/18/15   Substance Use Topics    Alcohol use: Yes     Alcohol/week: 0.0 standard drinks    Drug use: Never       Chart reviewed and updated where appropriate for PMH, Fam, and Soc Hx.  _________________________________________________________  ROS: POSITIVE: As in the HPI. Otherwise Pertinent negatives are negative.    __________________________________________________________  Physical Exam   Constitutional:    She is oriented to person, place, and time. She appears well-developed and well-nourished. Neck:    Normal range of motion. No thyromegaly or nodules noted. No bruit. No LAD. Musculoskeletal:    Normal range of motion.      No joint swelling noted.    No peripheral edema. Decreased sensation at tip, medial and lateral sides of 2nd and 3rd digits  Strength 5/5 B/L UE,  strength 5/5 B/L  Tinel's and Phalen's test negative B/L  Pain with resisted extension at the elbow in the triceps. Neurological:    She is A&Ox3    Motor and sensation grossly intact. Normal Gait. Skin:    Skin is warm and dry. No rashes, No lesions. ________________________________________________________    This note may have been created using dictation software.  Efforts were made to reduce errors, but some may persist.

## 2022-10-04 DIAGNOSIS — F41.9 ANXIETY: ICD-10-CM

## 2022-10-04 RX ORDER — FLUOXETINE HYDROCHLORIDE 40 MG/1
CAPSULE ORAL
Qty: 30 CAPSULE | Refills: 2 | Status: SHIPPED | OUTPATIENT
Start: 2022-10-04